# Patient Record
Sex: MALE | Race: WHITE | Employment: OTHER | URBAN - METROPOLITAN AREA
[De-identification: names, ages, dates, MRNs, and addresses within clinical notes are randomized per-mention and may not be internally consistent; named-entity substitution may affect disease eponyms.]

---

## 2017-02-22 ENCOUNTER — ANESTHESIA EVENT (OUTPATIENT)
Dept: SURGERY | Age: 52
End: 2017-02-22
Payer: COMMERCIAL

## 2017-02-22 ENCOUNTER — HOSPITAL ENCOUNTER (OUTPATIENT)
Dept: SURGERY | Age: 52
Discharge: HOME | End: 2017-02-22

## 2017-02-22 VITALS — BODY MASS INDEX: 29.73 KG/M2 | WEIGHT: 185 LBS | HEIGHT: 66 IN

## 2017-02-22 NOTE — PERIOP NOTES
Patient verified name, , and surgery as listed in [facility]. Type 1A surgery, PAT phone assessment complete. Orders received. Labs per surgeon: none. Labs per anesthesia protocol: none. Patient answered medical/surgical history questions at their best of ability. All prior to admission medications documented in [facility]. Patient instructed to take the following medications the day of surgery according to anesthesia guidelines with a small sip of water: none. Hold all vitamins 7 days prior to surgery and NSAIDS 5 days prior to surgery. Medications to be held: Probiotic. Patient instructed on the following and verbalizes understanding:  Arrive at [facility], time of arrival to be called the day before by 1700  NPO after midnight including gum, mints, and ice chips  Responsible adult must drive patient to the hospital, stay during surgery, and patient will  need supervision 24 hours after anesthesia  Use antibacterial soap and Hibiclens in shower the night before surgery and on the morning of surgery  Leave all valuables(money and jewelry) at home but bring insurance card and ID on DOS  Do not wear make-up, nail polish, lotions, cologne, perfumes, powders, or oil on skin.

## 2017-02-22 NOTE — BRIEF OP NOTE
BRIEF OPERATIVE NOTE    Date of Procedure: 2/23/2017     Preoperative Diagnosis:  Carpal tunnel syndrome, right [G56.01]    Postoperative Diagnosis:  SAME     Procedure(s): CARPAL TUNNEL RELEASE RIGHT WRIST    Surgeon(s) and Role:     * Osmany Blanc MD - Primary              Anesthesia: Petar     Complications: NONE        TOURNIQUET:  28 Kesha Ritter MD

## 2017-02-22 NOTE — H&P
[facility] HISTORY AND PHYSICAL    Subjective:     Patient is a 46 y.o. RHD MALE WITH RIGHT WRIST PAIN. SEE OFFICE NOTE. Patient Active Problem List    Diagnosis Date Noted    Hiatal hernia 02/02/2016     Past Medical History:   Diagnosis Date    GERD (gastroesophageal reflux disease)     controlled by probiotic    Hiatal hernia 02/02/2016    see on EGD      No past surgical history on file. Prior to Admission medications    Medication Sig Start Date End Date Taking? Authorizing Provider   MARYLUinfantis-B.ani-B.long-B.bifi (PROBIOTIC 4X) 10-15 mg TbEC Take  by mouth. Historical Provider     No Known Allergies   Social History   Substance Use Topics    Smoking status: Never Smoker    Smokeless tobacco: Never Used    Alcohol use No      Family History   Problem Relation Age of Onset    Dementia Mother     Heart defect Mother     No Known Problems Father     No Known Problems Sister       Review of Systems  A comprehensive review of systems was negative except for that written in the HPI. Objective:     No data found. There were no vitals taken for this visit. General:  Alert, cooperative, no distress, appears stated age. Head:  Normocephalic, without obvious abnormality, atraumatic. Back:   Symmetric, no curvature. ROM normal. No CVA tenderness. Lungs:   Clear to auscultation bilaterally. Chest wall:  No tenderness or deformity. Heart:  Regular rate and rhythm, S1, S2 normal, no murmur, click, rub or gallop. Extremities: Extremities normal, atraumatic, no cyanosis or edema. Pulses: 2+ and symmetric all extremities. Skin: Skin color, texture, turgor normal. No rashes or lesions   Lymph nodes: Cervical, supraclavicular, and axillary nodes normal.   Neurologic: CNII-XII intact. Normal strength, sensation and reflexes throughout.            Assessment:     CARPAL TUNNEL SYNDROME RIGHT WRIST    Plan:     The various methods of treatment have been discussed with the patient and family. PATIENT HAS EXHAUSTED NON-OPERATIVE MODALITIES. After consideration of risks, benefits and other options for treatment, the patient has consented to surgical intervention. SEE OFFICE NOTE.

## 2017-02-23 ENCOUNTER — SURGERY (OUTPATIENT)
Age: 52
End: 2017-02-23

## 2017-02-23 ENCOUNTER — ANESTHESIA (OUTPATIENT)
Dept: SURGERY | Age: 52
End: 2017-02-23
Payer: COMMERCIAL

## 2017-02-23 ENCOUNTER — HOSPITAL ENCOUNTER (OUTPATIENT)
Age: 52
Setting detail: OUTPATIENT SURGERY
Discharge: HOME | End: 2017-02-23
Attending: ORTHOPAEDIC SURGERY | Admitting: ORTHOPAEDIC SURGERY
Payer: COMMERCIAL

## 2017-02-23 VITALS
WEIGHT: 188.38 LBS | SYSTOLIC BLOOD PRESSURE: 113 MMHG | BODY MASS INDEX: 30.4 KG/M2 | TEMPERATURE: 97.6 F | DIASTOLIC BLOOD PRESSURE: 68 MMHG | HEART RATE: 56 BPM | RESPIRATION RATE: 16 BRPM | OXYGEN SATURATION: 98 %

## 2017-02-23 PROBLEM — G56.01 CARPAL TUNNEL SYNDROME ON RIGHT: Status: ACTIVE | Noted: 2017-02-23

## 2017-02-23 PROCEDURE — 74011250636 HC RX REV CODE- 250/636: Performed by: ANESTHESIOLOGY

## 2017-02-23 PROCEDURE — 77030018836 HC SOL IRR NACL ICUM -A: Performed by: ORTHOPAEDIC SURGERY

## 2017-02-23 PROCEDURE — 76060000032 HC ANESTHESIA 0.5 TO 1 HR: Performed by: ORTHOPAEDIC SURGERY

## 2017-02-23 PROCEDURE — 74011250636 HC RX REV CODE- 250/636

## 2017-02-23 PROCEDURE — 77030002916 HC SUT ETHLN J&J -A: Performed by: ORTHOPAEDIC SURGERY

## 2017-02-23 PROCEDURE — 76210000006 HC OR PH I REC 0.5 TO 1 HR: Performed by: ORTHOPAEDIC SURGERY

## 2017-02-23 PROCEDURE — 74011250636 HC RX REV CODE- 250/636: Performed by: ORTHOPAEDIC SURGERY

## 2017-02-23 PROCEDURE — 74011000250 HC RX REV CODE- 250: Performed by: ANESTHESIOLOGY

## 2017-02-23 PROCEDURE — 77030020753 HC CUF TRNQT 1BLA STRY -B: Performed by: ORTHOPAEDIC SURGERY

## 2017-02-23 PROCEDURE — 76010000138 HC OR TIME 0.5 TO 1 HR: Performed by: ORTHOPAEDIC SURGERY

## 2017-02-23 PROCEDURE — 76210000020 HC REC RM PH II FIRST 0.5 HR: Performed by: ORTHOPAEDIC SURGERY

## 2017-02-23 RX ORDER — ROPIVACAINE HYDROCHLORIDE 5 MG/ML
INJECTION, SOLUTION EPIDURAL; INFILTRATION; PERINEURAL AS NEEDED
Status: DISCONTINUED | OUTPATIENT
Start: 2017-02-23 | End: 2017-02-23 | Stop reason: HOSPADM

## 2017-02-23 RX ORDER — OXYCODONE HYDROCHLORIDE 5 MG/1
5 TABLET ORAL
Status: DISCONTINUED | OUTPATIENT
Start: 2017-02-23 | End: 2017-02-23 | Stop reason: HOSPADM

## 2017-02-23 RX ORDER — FLUMAZENIL 0.1 MG/ML
0.2 INJECTION INTRAVENOUS AS NEEDED
Status: DISCONTINUED | OUTPATIENT
Start: 2017-02-23 | End: 2017-02-23 | Stop reason: HOSPADM

## 2017-02-23 RX ORDER — SODIUM CHLORIDE, SODIUM LACTATE, POTASSIUM CHLORIDE, CALCIUM CHLORIDE 600; 310; 30; 20 MG/100ML; MG/100ML; MG/100ML; MG/100ML
75 INJECTION, SOLUTION INTRAVENOUS CONTINUOUS
Status: DISCONTINUED | OUTPATIENT
Start: 2017-02-23 | End: 2017-02-23 | Stop reason: HOSPADM

## 2017-02-23 RX ORDER — LIDOCAINE HYDROCHLORIDE 10 MG/ML
0.1 INJECTION INFILTRATION; PERINEURAL AS NEEDED
Status: DISCONTINUED | OUTPATIENT
Start: 2017-02-23 | End: 2017-02-23 | Stop reason: HOSPADM

## 2017-02-23 RX ORDER — MIDAZOLAM HYDROCHLORIDE 1 MG/ML
2 INJECTION, SOLUTION INTRAMUSCULAR; INTRAVENOUS
Status: DISCONTINUED | OUTPATIENT
Start: 2017-02-23 | End: 2017-02-23 | Stop reason: HOSPADM

## 2017-02-23 RX ORDER — NALOXONE HYDROCHLORIDE 0.4 MG/ML
0.1 INJECTION, SOLUTION INTRAMUSCULAR; INTRAVENOUS; SUBCUTANEOUS
Status: DISCONTINUED | OUTPATIENT
Start: 2017-02-23 | End: 2017-02-23 | Stop reason: HOSPADM

## 2017-02-23 RX ORDER — LIDOCAINE HYDROCHLORIDE 5 MG/ML
INJECTION, SOLUTION INFILTRATION; INTRAVENOUS AS NEEDED
Status: DISCONTINUED | OUTPATIENT
Start: 2017-02-23 | End: 2017-02-23 | Stop reason: HOSPADM

## 2017-02-23 RX ORDER — CEFAZOLIN SODIUM IN 0.9 % NACL 2 G/50 ML
2 INTRAVENOUS SOLUTION, PIGGYBACK (ML) INTRAVENOUS ONCE
Status: COMPLETED | OUTPATIENT
Start: 2017-02-23 | End: 2017-02-23

## 2017-02-23 RX ORDER — PROPOFOL 10 MG/ML
INJECTION, EMULSION INTRAVENOUS
Status: DISCONTINUED | OUTPATIENT
Start: 2017-02-23 | End: 2017-02-23 | Stop reason: HOSPADM

## 2017-02-23 RX ORDER — FENTANYL CITRATE 50 UG/ML
INJECTION, SOLUTION INTRAMUSCULAR; INTRAVENOUS AS NEEDED
Status: DISCONTINUED | OUTPATIENT
Start: 2017-02-23 | End: 2017-02-23 | Stop reason: HOSPADM

## 2017-02-23 RX ORDER — HYDROMORPHONE HYDROCHLORIDE 2 MG/ML
0.5 INJECTION, SOLUTION INTRAMUSCULAR; INTRAVENOUS; SUBCUTANEOUS
Status: DISCONTINUED | OUTPATIENT
Start: 2017-02-23 | End: 2017-02-23 | Stop reason: HOSPADM

## 2017-02-23 RX ORDER — DIPHENHYDRAMINE HYDROCHLORIDE 50 MG/ML
12.5 INJECTION, SOLUTION INTRAMUSCULAR; INTRAVENOUS
Status: DISCONTINUED | OUTPATIENT
Start: 2017-02-23 | End: 2017-02-23 | Stop reason: HOSPADM

## 2017-02-23 RX ORDER — OXYCODONE HYDROCHLORIDE 5 MG/1
10 TABLET ORAL
Status: DISCONTINUED | OUTPATIENT
Start: 2017-02-23 | End: 2017-02-23 | Stop reason: HOSPADM

## 2017-02-23 RX ORDER — MIDAZOLAM HYDROCHLORIDE 1 MG/ML
INJECTION, SOLUTION INTRAMUSCULAR; INTRAVENOUS AS NEEDED
Status: DISCONTINUED | OUTPATIENT
Start: 2017-02-23 | End: 2017-02-23 | Stop reason: HOSPADM

## 2017-02-23 RX ADMIN — MIDAZOLAM HYDROCHLORIDE 1 MG: 1 INJECTION, SOLUTION INTRAMUSCULAR; INTRAVENOUS at 07:14

## 2017-02-23 RX ADMIN — FENTANYL CITRATE 50 MCG: 50 INJECTION, SOLUTION INTRAMUSCULAR; INTRAVENOUS at 07:31

## 2017-02-23 RX ADMIN — FENTANYL CITRATE 50 MCG: 50 INJECTION, SOLUTION INTRAMUSCULAR; INTRAVENOUS at 07:22

## 2017-02-23 RX ADMIN — SODIUM CHLORIDE, SODIUM LACTATE, POTASSIUM CHLORIDE, AND CALCIUM CHLORIDE: 600; 310; 30; 20 INJECTION, SOLUTION INTRAVENOUS at 07:01

## 2017-02-23 RX ADMIN — LIDOCAINE HYDROCHLORIDE 50 ML: 5 INJECTION, SOLUTION INFILTRATION; INTRAVENOUS at 07:10

## 2017-02-23 RX ADMIN — MIDAZOLAM HYDROCHLORIDE 1 MG: 1 INJECTION, SOLUTION INTRAMUSCULAR; INTRAVENOUS at 07:05

## 2017-02-23 RX ADMIN — CEFAZOLIN 2 G: 1 INJECTION, POWDER, FOR SOLUTION INTRAMUSCULAR; INTRAVENOUS; PARENTERAL at 07:01

## 2017-02-23 RX ADMIN — PROPOFOL 75 MCG/KG/MIN: 10 INJECTION, EMULSION INTRAVENOUS at 07:14

## 2017-02-23 RX ADMIN — ROPIVACAINE HYDROCHLORIDE 10 ML: 5 INJECTION, SOLUTION EPIDURAL; INFILTRATION; PERINEURAL at 07:37

## 2017-02-23 RX ADMIN — LIDOCAINE HYDROCHLORIDE 0.1 ML: 10 INJECTION, SOLUTION INFILTRATION; PERINEURAL at 06:30

## 2017-02-23 RX ADMIN — SODIUM CHLORIDE, SODIUM LACTATE, POTASSIUM CHLORIDE, AND CALCIUM CHLORIDE 75 ML/HR: 600; 310; 30; 20 INJECTION, SOLUTION INTRAVENOUS at 06:04

## 2017-02-23 NOTE — H&P
Date of Surgery Update:  Lizette Virgen was seen and examined. History and physical has been reviewed. The patient has been examined.  There have been no significant clinical changes since the completion of the originally dated History and Physical.    Signed By: Faheem Hairston MD     February 23, 2017 6:22 AM

## 2017-02-23 NOTE — IP AVS SNAPSHOT
[address] 
[phone] Patient: Peace Toribio MRN: [id number] :1965 You are allergic to the following No active allergies Recent Documentation Weight 85.4 kg Emergency Contacts Name Discharge Info Relation Home Work Mobile Chary Yates  Spouse [3] [phone] About your hospitalization You were admitted on:  2017 You last received care in the:  [facility] PACU You were discharged on:  2017 Unit phone number:  [phone] Why you were hospitalized Your primary diagnosis was:  Carpal Tunnel Syndrome On Right Providers Seen During Your Hospitalizations Provider Role Specialty Primary office phone Anna Newman MD Attending Provider Orthopedic Surgery [phone] Your Primary Care Physician (PCP) Primary Care Physician Office Phone Office Fax Winferd Longford [phone] Follow-up Information Follow up With Details Comments Contact Info Keyanna Roche DO   [address] [place] [address] 
[phone] Anna Newman MD Call today Keep scheduled appointment or call to reschedule [facility] [address] 16 Current Discharge Medication List  
  
ASK your doctor about these medications Dose & Instructions Dispensing Information Comments Morning Noon Evening Bedtime PROBIOTIC 4X 10-15 mg Tbec Generic drug:  B.infantis-B.ani-B.long-B.bifi Your next dose is: Today, Tomorrow Other:  _________ Take  by mouth. Refills:  0 Discharge Instructions Carpal Tunnel Release: What to Expect at Home Your Recovery Your hand will hurt and may feel weak with some numbness.  This usually goes away in a few days, but it may take several months. Your doctor may remove the large bandage, or he or she will tell you when and how to remove it yourself. In some cases, you may have a splint. If you have one, you will wear it for about 2 weeks. Your doctor will take out your stitches in 1 to 2 weeks. Your hand and wrist may feel worse than they had felt. But the pain should begin to go away. It usually takes 3 to 4 months to recover and up to 1 year before hand strength returns. How much hand strength returns will vary. The timing of your return to work depends on the type of surgery you had, whether the surgery was on your dominant hand (the hand you use most), and your work activities. If you had open surgery on your dominant hand and you do repeated actions at work, you may be able to return to work in 10 to 8 weeks. Repeated motions include typing or assembly-line work. If the surgery was on the other hand and you do not do repeated actions at work, you may be able to return to work in 9 to 14 days. If you had endoscopic surgery, you may be able to return to work sooner than with open surgery. This care sheet gives you a general idea about how long it will take for you to recover. But each person recovers at a different pace. Follow the steps below to get better as quickly as possible. How can you care for yourself at home? Activity · Rest when you feel tired. Getting enough sleep will help you recover. · Try to walk each day. Start by walking a little more than you did the day before. Bit by bit, increase the amount you walk. · For up to 2 weeks after surgery, avoid lifting things heavier than 1 to 2 pounds and using your hand. This includes doing repeated arm or hand movements, such as typing or using a computer mouse, washing windows, vacuuming, or chopping food. Do not use power tools, and avoid activities that cause vibration. · You may begin heavier tasks about 4 weeks after surgery. These include vacuuming, mowing the lawn, and gardening. · You may shower 24 to 48 hours after surgery, if your doctor okays it. Keep your bandage dry by taping a sheet of plastic to cover it. If you have a splint, keep it dry. Your doctor will tell you whether you can remove it when you shower. Be careful not to put the splint on too tight. Do not take a bath until the incision heals, or until your doctor tells you it is okay. · You may drive when you are fully able to use your hand. Diet · You can eat your normal diet. If your stomach is upset, try bland, low-fat foods like plain rice, broiled chicken, toast, and yogurt. Medicines · Your doctor will tell you if and when you can restart your medicines. He or she will also give you instructions about taking any new medicines. · If you take blood thinners, such as warfarin (Coumadin), clopidogrel (Plavix), or aspirin, be sure to talk to your doctor. He or she will tell you if and when to start taking those medicines again. Make sure that you understand exactly what your doctor wants you to do. · Take pain medicines exactly as directed. ¨ If the doctor gave you a prescription medicine for pain, take it as prescribed. ¨ If you are not taking a prescription pain medicine, take an over-the-counter medicine such as acetaminophen (Tylenol), ibuprofen (Advil, Motrin), or naproxen (Aleve). Read and follow all instructions on the label. ¨ Do not take two or more pain medicines at the same time unless the doctor told you to. Many pain medicines have acetaminophen, which is Tylenol. Too much acetaminophen (Tylenol) can be harmful. · If you think your pain medicine is making you sick to your stomach: 
¨ Take your medicine after meals (unless your doctor has told you not to). ¨ Ask your doctor for a different pain medicine. · If your doctor prescribed antibiotics, take them as directed.  Do not stop taking them just because you feel better. You need to take the full course of antibiotics. Incision and splint care · Keep your bandage dry. If it gets dirty, you may change it. · If you have a splint, talk to your doctor about when you should wear it. Exercise · You may need wrist and hand rehabilitation. This is a series of exercises you do after your surgery. This helps you get back your wrist's and hand's range of motion, strength, and . You will work with your doctor and physical or occupational therapist to plan this exercise program. To get the best results, you need to do the exercises correctly and as often and as long as your doctor tells you. Ice and elevation · Put ice or a cold pack on your wrist for 10 to 20 minutes at a time. Try to do this every 1 to 2 hours for the next 3 days (when you are awake) or until the swelling goes down. Put a thin cloth between the ice and your skin. · Prop up the sore wrist on a pillow when you ice it or anytime you sit or lie down during the next 3 days. Try to keep it above the level of your heart. This will help reduce swelling. Other instructions · Avoid letting your hand hang down. This can cause swelling. Follow-up care is a key part of your treatment and safety. Be sure to make and go to all appointments, and call your doctor if you are having problems. It's also a good idea to know your test results and keep a list of the medicines you take. When should you call for help? Call 911 anytime you think you may need emergency care. For example, call if: 
· You passed out (lost consciousness). · You have severe trouble breathing. · You have sudden chest pain and shortness of breath, or you cough up blood. Call your doctor now or seek immediate medical care if: 
· You have pain that does not get better after you take pain medicine. · You have loose stitches, or your incision comes open. · You continue to bleed from the incision after surgery. · You have signs of infection, such as: 
¨ Increased pain, swelling, warmth, or redness. ¨ Red streaks leading from the incision. ¨ Pus draining from the incision. ¨ Swollen lymph nodes in your neck, armpits, or groin. ¨ A fever. · Your hand is cool or pale, or it changes color. Watch closely for any changes in your health, and be sure to contact your doctor if: 
· You notice more tingling or weakness in your hand. · You are sick to your stomach or cannot keep fluids down. · You are not getting better as expected. Where can you learn more? Go to http://silva-quoc.info/. Enter N388 in the search box to learn more about \"Carpal Tunnel Release: What to Expect at Home. \" Current as of: May 23, 2016 Content Version: 11.1 © 3521-4229 [employer]. Care instructions adapted under license by [employer] (which disclaims liability or warranty for this information). If you have questions about a medical condition or this instruction, always ask your healthcare professional. [place] [address] any warranty or liability for your use of this information. Discharge Orders None Introducing [facility]! [address] introduces Oobafit patient portal. Now you can access parts of your medical record, email your doctor's office, and request medication refills online. 1. In your internet browser, go to [email or web address] 2. Click on the First Time User? Click Here link in the Sign In box. You will see the New Member Sign Up page. 3. Enter your Oobafit Access Code exactly as it appears below. You will not need to use this code after youve completed the sign-up process. If you do not sign up before the expiration date, you must request a new code. · Oobafit Access Code: [email or web address] Expires: 5/8/2017  1:54 PM 
 
4.  Enter the last four digits of your Social Security Number (xxxx) and Date of Birth (mm/dd/yyyy) as indicated and click Submit. You will be taken to the next sign-up page. 5. Create a Keoghs ID. This will be your Keoghs login ID and cannot be changed, so think of one that is secure and easy to remember. 6. Create a Keoghs password. You can change your password at any time. 7. Enter your Password Reset Question and Answer. This can be used at a later time if you forget your password. 8. Enter your e-mail address. You will receive e-mail notification when new information is available in [address] 9. Click Sign Up. You can now view and download portions of your medical record. 10. Click the Download Summary menu link to download a portable copy of your medical information. If you have questions, please visit the Frequently Asked Questions section of the Keoghs website. Remember, Keoghs is NOT to be used for urgent needs. For medical emergencies, dial 911. Now available from your iPhone and Android! General Information Please provide this summary of care documentation to your next provider. Patient Signature:  ____________________________________________________________ Date:  ____________________________________________________________  
  
Tsering Fulton Provider Signature:  ____________________________________________________________ Date:  ____________________________________________________________

## 2017-02-23 NOTE — IP AVS SNAPSHOT
Current Discharge Medication List  
  
ASK your doctor about these medications Dose & Instructions Dispensing Information Comments Morning Noon Evening Bedtime PROBIOTIC 4X 10-15 mg Tbec Generic drug:  B.infantis-B.ani-B.long-B.bifi Your next dose is: Today, Tomorrow Other:  ____________ Take  by mouth. Refills:  0

## 2017-02-23 NOTE — ANESTHESIA POSTPROCEDURE EVALUATION
Post-Anesthesia Evaluation and Assessment    Patient: Phyllis Lemus MRN: [id number]  SSN: [id number]    YOB: 1965  Age: 46 y.o. Sex: male       Cardiovascular Function/Vital Signs  Visit Vitals    /66 (BP 1 Location: Left arm, BP Patient Position: At rest)    Pulse 65    Temp 36.4 °C (97.6 °F)    Resp 16    Wt 85.4 kg (188 lb 6 oz)    SpO2 97%    BMI 30.4 kg/m2       Patient is status post regional anesthesia for Procedure(s):  RIGHT HAND CARPAL TUNNEL RELEASE. Nausea/Vomiting: None    Postoperative hydration reviewed and adequate. Pain:  Pain Scale 1: Numeric (0 - 10) (02/23/17 0747)  Pain Intensity 1: 0 (02/23/17 0747)   Managed    Neurological Status:   Neuro (WDL): Exceptions to WDL (02/23/17 0747)  Neuro  Neurologic State: Drowsy (02/23/17 7041)  Orientation Level: Oriented to person;Oriented to place;Oriented to situation (02/23/17 0747)  Cognition: Follows commands (02/23/17 0747)  Speech: Clear (02/23/17 0747)   At baseline    Mental Status and Level of Consciousness: Arousable    Pulmonary Status:   O2 Device: Room air (02/23/17 0758)   Adequate oxygenation and airway patent    Complications related to anesthesia: None    Post-anesthesia assessment completed.  No concerns    Signed By: Irasema Zamora MD     February 23, 2017

## 2017-02-23 NOTE — OP NOTES
[facility] 65   OPERATIVE REPORT       Name:  Donovan Ivan   MR#:  [id number]   :  1965   Account #:  [de-identified]   Date of Adm:  2017       DATE OF SURGERY: 2017    PREOPERATIVE DIAGNOSES: Carpal tunnel syndrome, right wrist.    POSTOPERATIVE DIAGNOSES: Carpal tunnel syndrome, right wrist.    PROCEDURE: Carpal tunnel release, right wrist.    OPERATIVE SURGEON: Sherwin Foster. Peace Bonner MD    ANESTHESIA: [place] block. PATHOLOGY: Carpal tunnel syndrome. CURRENT PROCEDURAL TERMINOLOGY CODE: 03099. INTERNATIONAL CLASSIFICATION OF DISEASES 10 CODE: G56.01.    TOURNIQUET TIME: Including Petar block time was 28 minutes. INDICATIONS: The patient is a 26-year-old gentleman who has   developed right hand numbness and tingling. Preoperative   physical exam, radiographs, and EMG nerve conduction studies   demonstrate carpal tunnel syndrome, right wrist. The patient has   exhausted nonoperative modalities and is electively admitted for   a carpal tunnel release, right wrist.    DESCRIPTION OF PROCEDURE: Following identification, the patient   was taken to the operating suite. Following induction of Manns Choice   block anesthesia and 2 g of IV Ancef, the patient was positioned   on the operating table in the supine fashion. Right arm was then   elevated, exsanguinated with an Esmarch bandage. Tourniquet was   elevated to 250 mmHg. Mann[place] Choice block was applied. Once that was   achieved, the right arm and fingers were prepped and draped in a   sterile fashion. A 3 cm incision based over the volar wrist   crease distally was performed. Skin was incised. Subcutaneous   tissue was then dissected down to the median nerve. Median nerve   was protected throughout the procedure. Transverse carpal   ligament was identified. Transverse carpal ligament was   released. Median nerves were protected throughout the procedure.    Once an adequate decompression was achieved, the floor of the   carpal tunnel was inspected. There was no evidence of any loose   body, foreign body, or mass. Median nerve was intact. Recurrent   branch of the median nerve was intact as well. At this point,   the wound was then irrigated and closed with 3-0 nylon   horizontal mattress sutures. The hand was injected with 10 mL of   Naropin without epinephrine. A sterile dressing was applied. Tourniquet was released. Tourniquet time was 28 minutes. The   patient was then transferred to the recovery room in stable   condition.         MD BRUNILDA Tam / [id number]   D:  02/23/2017   07:47   T:  02/23/2017   11:07   Job #:  [id number]

## 2017-02-23 NOTE — ANESTHESIA PREPROCEDURE EVALUATION
Anesthetic History   No history of anesthetic complications            Review of Systems / Medical History  Patient summary reviewed, nursing notes reviewed and pertinent labs reviewed    Pulmonary  Within defined limits                 Neuro/Psych   Within defined limits           Cardiovascular  Within defined limits                Exercise tolerance: >4 METS     GI/Hepatic/Renal     GERD (No meds): well controlled           Endo/Other  Within defined limits           Other Findings              Physical Exam    Airway  Mallampati: II  TM Distance: > 6 cm  Neck ROM: normal range of motion   Mouth opening: Normal     Cardiovascular    Rhythm: regular  Rate: normal         Dental  No notable dental hx       Pulmonary  Breath sounds clear to auscultation               Abdominal         Other Findings            Anesthetic Plan    ASA: 2  Anesthesia type: regional - Petar block            Anesthetic plan and risks discussed with: Patient and Spouse

## 2017-02-23 NOTE — DISCHARGE INSTRUCTIONS
Carpal Tunnel Release: What to Expect at Home  Your Recovery  Your hand will hurt and may feel weak with some numbness. This usually goes away in a few days, but it may take several months. Your doctor may remove the large bandage, or he or she will tell you when and how to remove it yourself. In some cases, you may have a splint. If you have one, you will wear it for about 2 weeks. Your doctor will take out your stitches in 1 to 2 weeks. Your hand and wrist may feel worse than they had felt. But the pain should begin to go away. It usually takes 3 to 4 months to recover and up to 1 year before hand strength returns. How much hand strength returns will vary. The timing of your return to work depends on the type of surgery you had, whether the surgery was on your dominant hand (the hand you use most), and your work activities. If you had open surgery on your dominant hand and you do repeated actions at work, you may be able to return to work in 10 to 8 weeks. Repeated motions include typing or assembly-line work. If the surgery was on the other hand and you do not do repeated actions at work, you may be able to return to work in 9 to 14 days. If you had endoscopic surgery, you may be able to return to work sooner than with open surgery. This care sheet gives you a general idea about how long it will take for you to recover. But each person recovers at a different pace. Follow the steps below to get better as quickly as possible. How can you care for yourself at home? Activity  · Rest when you feel tired. Getting enough sleep will help you recover. · Try to walk each day. Start by walking a little more than you did the day before. Bit by bit, increase the amount you walk. · For up to 2 weeks after surgery, avoid lifting things heavier than 1 to 2 pounds and using your hand.  This includes doing repeated arm or hand movements, such as typing or using a computer mouse, washing windows, vacuuming, or chopping food. Do not use power tools, and avoid activities that cause vibration. · You may begin heavier tasks about 4 weeks after surgery. These include vacuuming, mowing the lawn, and gardening. · You may shower 24 to 48 hours after surgery, if your doctor okays it. Keep your bandage dry by taping a sheet of plastic to cover it. If you have a splint, keep it dry. Your doctor will tell you whether you can remove it when you shower. Be careful not to put the splint on too tight. Do not take a bath until the incision heals, or until your doctor tells you it is okay. · You may drive when you are fully able to use your hand. Diet  · You can eat your normal diet. If your stomach is upset, try bland, low-fat foods like plain rice, broiled chicken, toast, and yogurt. Medicines  · Your doctor will tell you if and when you can restart your medicines. He or she will also give you instructions about taking any new medicines. · If you take blood thinners, such as warfarin (Coumadin), clopidogrel (Plavix), or aspirin, be sure to talk to your doctor. He or she will tell you if and when to start taking those medicines again. Make sure that you understand exactly what your doctor wants you to do. · Take pain medicines exactly as directed. ¨ If the doctor gave you a prescription medicine for pain, take it as prescribed. ¨ If you are not taking a prescription pain medicine, take an over-the-counter medicine such as acetaminophen (Tylenol), ibuprofen (Advil, Motrin), or naproxen (Aleve). Read and follow all instructions on the label. ¨ Do not take two or more pain medicines at the same time unless the doctor told you to. Many pain medicines have acetaminophen, which is Tylenol. Too much acetaminophen (Tylenol) can be harmful. · If you think your pain medicine is making you sick to your stomach:  ¨ Take your medicine after meals (unless your doctor has told you not to). ¨ Ask your doctor for a different pain medicine.   · If your doctor prescribed antibiotics, take them as directed. Do not stop taking them just because you feel better. You need to take the full course of antibiotics. Incision and splint care  · Keep your bandage dry. If it gets dirty, you may change it. · If you have a splint, talk to your doctor about when you should wear it. Exercise  · You may need wrist and hand rehabilitation. This is a series of exercises you do after your surgery. This helps you get back your wrist's and hand's range of motion, strength, and . You will work with your doctor and physical or occupational therapist to plan this exercise program. To get the best results, you need to do the exercises correctly and as often and as long as your doctor tells you. Ice and elevation  · Put ice or a cold pack on your wrist for 10 to 20 minutes at a time. Try to do this every 1 to 2 hours for the next 3 days (when you are awake) or until the swelling goes down. Put a thin cloth between the ice and your skin. · Prop up the sore wrist on a pillow when you ice it or anytime you sit or lie down during the next 3 days. Try to keep it above the level of your heart. This will help reduce swelling. Other instructions  · Avoid letting your hand hang down. This can cause swelling. Follow-up care is a key part of your treatment and safety. Be sure to make and go to all appointments, and call your doctor if you are having problems. It's also a good idea to know your test results and keep a list of the medicines you take. When should you call for help? Call 911 anytime you think you may need emergency care. For example, call if:  · You passed out (lost consciousness). · You have severe trouble breathing. · You have sudden chest pain and shortness of breath, or you cough up blood. Call your doctor now or seek immediate medical care if:  · You have pain that does not get better after you take pain medicine.   · You have loose stitches, or your incision comes open.  · You continue to bleed from the incision after surgery. · You have signs of infection, such as:  ¨ Increased pain, swelling, warmth, or redness. ¨ Red streaks leading from the incision. ¨ Pus draining from the incision. ¨ Swollen lymph nodes in your neck, armpits, or groin. ¨ A fever. · Your hand is cool or pale, or it changes color. Watch closely for any changes in your health, and be sure to contact your doctor if:  · You notice more tingling or weakness in your hand. · You are sick to your stomach or cannot keep fluids down. · You are not getting better as expected. Where can you learn more? Go to http://silva-quoc.info/. Enter N388 in the search box to learn more about \"Carpal Tunnel Release: What to Expect at Home. \"  Current as of: May 23, 2016  Content Version: 11.1  © 9304-7587 [employer]. Care instructions adapted under license by [employer] (which disclaims liability or warranty for this information). If you have questions about a medical condition or this instruction, always ask your healthcare professional. [place] [address] any warranty or liability for your use of this information.

## 2020-04-29 ENCOUNTER — HOSPITAL ENCOUNTER (OUTPATIENT)
Dept: SURGERY | Age: 55
Discharge: HOME | End: 2020-04-29

## 2020-04-29 VITALS — HEIGHT: 67 IN | BODY MASS INDEX: 29.82 KG/M2 | WEIGHT: 190 LBS

## 2020-04-29 RX ORDER — OMEPRAZOLE 20 MG/1
20 TABLET, DELAYED RELEASE ORAL AS NEEDED
COMMUNITY
End: 2021-07-21 | Stop reason: DRUGHIGH

## 2020-04-29 NOTE — PERIOP NOTES
Patient verified name and . Order for consent NOT found in EHR and unable to match consent with case posting; patient verifies procedure. Type 1B surgery, phone assessment complete. Orders NOT received. Labs per surgeon: unknown, no orders  Labs per anesthesia protocol: none      Patient answered medical/surgical history questions at their best of ability. All prior to admission medications documented in [facility]. Patient instructed to take the following medications the day of surgery according to anesthesia guidelines with a small sip of water: NONE. Hold all vitamins 7 days prior to surgery and NSAIDS 5 days prior to surgery. Prescription meds to hold: Meloxicam hold for 5 days prior to surgery. Patient instructed on the following:  Visitor policy- none. Arrive at [address], time of arrival to be called the day before by 1700  NPO after midnight including gum, mints, and ice chips  Responsible adult must drive patient to the hospital, stay during surgery, and patient will need supervision 24 hours after anesthesia  Use antibacterial soap in shower the night before surgery and on the morning of surgery  All piercings must be removed prior to arrival.    Leave all valuables (money and jewelry) at home but bring insurance card and ID on       DOS. Do not wear make-up, nail polish, lotions, cologne, perfumes, powders, or oil on skin. Patient teach back successful and patient demonstrates knowledge of instruction.

## 2020-05-20 NOTE — PERIOP NOTES
Pt notified that a negative Covid swab result is required to proceed with surgery; the swab will be collected 7 days prior to surgery at the [address] at [address] [place] [address]. Appointment date/time found in EHR and provided to patient.

## 2020-05-21 LAB — SARS-COV-2, NAA: NOT DETECTED

## 2020-05-27 ENCOUNTER — ANESTHESIA EVENT (OUTPATIENT)
Dept: SURGERY | Age: 55
End: 2020-05-27
Payer: COMMERCIAL

## 2020-05-28 ENCOUNTER — HOSPITAL ENCOUNTER (OUTPATIENT)
Age: 55
Setting detail: OUTPATIENT SURGERY
Discharge: HOME | End: 2020-05-28
Attending: PODIATRIST | Admitting: PODIATRIST
Payer: COMMERCIAL

## 2020-05-28 ENCOUNTER — ANESTHESIA (OUTPATIENT)
Dept: SURGERY | Age: 55
End: 2020-05-28
Payer: COMMERCIAL

## 2020-05-28 VITALS
HEIGHT: 67 IN | OXYGEN SATURATION: 98 % | BODY MASS INDEX: 30.51 KG/M2 | WEIGHT: 194.4 LBS | HEART RATE: 68 BPM | DIASTOLIC BLOOD PRESSURE: 67 MMHG | SYSTOLIC BLOOD PRESSURE: 124 MMHG | RESPIRATION RATE: 18 BRPM | TEMPERATURE: 98 F

## 2020-05-28 DIAGNOSIS — Z98.890 S/P FOOT SURGERY, LEFT: Primary | ICD-10-CM

## 2020-05-28 PROCEDURE — 74011000250 HC RX REV CODE- 250: Performed by: PODIATRIST

## 2020-05-28 PROCEDURE — 77030036649: Performed by: PODIATRIST

## 2020-05-28 PROCEDURE — 77030041330 HC GRFT TISS BN MTRX ORGA -H: Performed by: PODIATRIST

## 2020-05-28 PROCEDURE — 74011000250 HC RX REV CODE- 250: Performed by: NURSE ANESTHETIST, CERTIFIED REGISTERED

## 2020-05-28 PROCEDURE — 76010000138 HC OR TIME 0.5 TO 1 HR: Performed by: PODIATRIST

## 2020-05-28 PROCEDURE — 76210000063 HC OR PH I REC FIRST 0.5 HR: Performed by: PODIATRIST

## 2020-05-28 PROCEDURE — 74011250636 HC RX REV CODE- 250/636: Performed by: PODIATRIST

## 2020-05-28 PROCEDURE — 74011250636 HC RX REV CODE- 250/636: Performed by: NURSE ANESTHETIST, CERTIFIED REGISTERED

## 2020-05-28 PROCEDURE — 76210000020 HC REC RM PH II FIRST 0.5 HR: Performed by: PODIATRIST

## 2020-05-28 PROCEDURE — 76060000032 HC ANESTHESIA 0.5 TO 1 HR: Performed by: PODIATRIST

## 2020-05-28 PROCEDURE — 74011250636 HC RX REV CODE- 250/636: Performed by: ANESTHESIOLOGY

## 2020-05-28 DEVICE — GRAFT HUM TISS M COVERING WND INVIVO NUCEL: Type: IMPLANTABLE DEVICE | Site: FOOT | Status: FUNCTIONAL

## 2020-05-28 RX ORDER — FENTANYL CITRATE 50 UG/ML
INJECTION, SOLUTION INTRAMUSCULAR; INTRAVENOUS AS NEEDED
Status: DISCONTINUED | OUTPATIENT
Start: 2020-05-28 | End: 2020-05-28 | Stop reason: HOSPADM

## 2020-05-28 RX ORDER — LIDOCAINE HYDROCHLORIDE 10 MG/ML
0.1 INJECTION INFILTRATION; PERINEURAL AS NEEDED
Status: DISCONTINUED | OUTPATIENT
Start: 2020-05-28 | End: 2020-05-28 | Stop reason: HOSPADM

## 2020-05-28 RX ORDER — NALOXONE HYDROCHLORIDE 0.4 MG/ML
0.1 INJECTION, SOLUTION INTRAMUSCULAR; INTRAVENOUS; SUBCUTANEOUS
Status: DISCONTINUED | OUTPATIENT
Start: 2020-05-28 | End: 2020-05-28 | Stop reason: HOSPADM

## 2020-05-28 RX ORDER — SODIUM CHLORIDE, SODIUM LACTATE, POTASSIUM CHLORIDE, CALCIUM CHLORIDE 600; 310; 30; 20 MG/100ML; MG/100ML; MG/100ML; MG/100ML
75 INJECTION, SOLUTION INTRAVENOUS CONTINUOUS
Status: DISCONTINUED | OUTPATIENT
Start: 2020-05-28 | End: 2020-05-28 | Stop reason: HOSPADM

## 2020-05-28 RX ORDER — FLUMAZENIL 0.1 MG/ML
0.2 INJECTION INTRAVENOUS AS NEEDED
Status: DISCONTINUED | OUTPATIENT
Start: 2020-05-28 | End: 2020-05-28 | Stop reason: HOSPADM

## 2020-05-28 RX ORDER — LIDOCAINE HYDROCHLORIDE 10 MG/ML
INJECTION INFILTRATION; PERINEURAL AS NEEDED
Status: DISCONTINUED | OUTPATIENT
Start: 2020-05-28 | End: 2020-05-28 | Stop reason: HOSPADM

## 2020-05-28 RX ORDER — PROPOFOL 10 MG/ML
INJECTION, EMULSION INTRAVENOUS
Status: DISCONTINUED | OUTPATIENT
Start: 2020-05-28 | End: 2020-05-28 | Stop reason: HOSPADM

## 2020-05-28 RX ORDER — LIDOCAINE HYDROCHLORIDE 20 MG/ML
INJECTION, SOLUTION EPIDURAL; INFILTRATION; INTRACAUDAL; PERINEURAL AS NEEDED
Status: DISCONTINUED | OUTPATIENT
Start: 2020-05-28 | End: 2020-05-28 | Stop reason: HOSPADM

## 2020-05-28 RX ORDER — TRAMADOL HYDROCHLORIDE 50 MG/1
50 TABLET ORAL
Qty: 20 TAB | Refills: 0 | Status: SHIPPED | OUTPATIENT
Start: 2020-05-28 | End: 2020-06-02

## 2020-05-28 RX ORDER — MIDAZOLAM HYDROCHLORIDE 1 MG/ML
2 INJECTION, SOLUTION INTRAMUSCULAR; INTRAVENOUS
Status: DISCONTINUED | OUTPATIENT
Start: 2020-05-28 | End: 2020-05-28 | Stop reason: HOSPADM

## 2020-05-28 RX ORDER — DIPHENHYDRAMINE HYDROCHLORIDE 50 MG/ML
12.5 INJECTION, SOLUTION INTRAMUSCULAR; INTRAVENOUS
Status: DISCONTINUED | OUTPATIENT
Start: 2020-05-28 | End: 2020-05-28 | Stop reason: HOSPADM

## 2020-05-28 RX ORDER — HYDROMORPHONE HYDROCHLORIDE 2 MG/ML
0.5 INJECTION, SOLUTION INTRAMUSCULAR; INTRAVENOUS; SUBCUTANEOUS
Status: DISCONTINUED | OUTPATIENT
Start: 2020-05-28 | End: 2020-05-28 | Stop reason: HOSPADM

## 2020-05-28 RX ORDER — CEFAZOLIN SODIUM/WATER 2 G/20 ML
2 SYRINGE (ML) INTRAVENOUS ONCE
Status: COMPLETED | OUTPATIENT
Start: 2020-05-28 | End: 2020-05-28

## 2020-05-28 RX ORDER — OXYCODONE HYDROCHLORIDE 5 MG/1
5 TABLET ORAL
Status: DISCONTINUED | OUTPATIENT
Start: 2020-05-28 | End: 2020-05-28 | Stop reason: HOSPADM

## 2020-05-28 RX ORDER — OXYCODONE HYDROCHLORIDE 5 MG/1
10 TABLET ORAL
Status: DISCONTINUED | OUTPATIENT
Start: 2020-05-28 | End: 2020-05-28 | Stop reason: HOSPADM

## 2020-05-28 RX ORDER — PROPOFOL 10 MG/ML
INJECTION, EMULSION INTRAVENOUS AS NEEDED
Status: DISCONTINUED | OUTPATIENT
Start: 2020-05-28 | End: 2020-05-28 | Stop reason: HOSPADM

## 2020-05-28 RX ORDER — BUPIVACAINE HYDROCHLORIDE 5 MG/ML
INJECTION, SOLUTION EPIDURAL; INTRACAUDAL AS NEEDED
Status: DISCONTINUED | OUTPATIENT
Start: 2020-05-28 | End: 2020-05-28 | Stop reason: HOSPADM

## 2020-05-28 RX ADMIN — SODIUM CHLORIDE, SODIUM LACTATE, POTASSIUM CHLORIDE, AND CALCIUM CHLORIDE 75 ML/HR: 600; 310; 30; 20 INJECTION, SOLUTION INTRAVENOUS at 13:00

## 2020-05-28 RX ADMIN — FENTANYL CITRATE 50 MCG: 50 INJECTION INTRAMUSCULAR; INTRAVENOUS at 13:31

## 2020-05-28 RX ADMIN — Medication 2 G: at 13:27

## 2020-05-28 RX ADMIN — FENTANYL CITRATE 50 MCG: 50 INJECTION INTRAMUSCULAR; INTRAVENOUS at 13:57

## 2020-05-28 RX ADMIN — PROPOFOL 120 MCG/KG/MIN: 10 INJECTION, EMULSION INTRAVENOUS at 13:39

## 2020-05-28 RX ADMIN — PROPOFOL 50 MG: 10 INJECTION, EMULSION INTRAVENOUS at 13:34

## 2020-05-28 RX ADMIN — LIDOCAINE HYDROCHLORIDE 20 MG: 20 INJECTION, SOLUTION EPIDURAL; INFILTRATION; INTRACAUDAL; PERINEURAL at 13:34

## 2020-05-28 NOTE — H&P
Patient: Blanquita Guillen MRN: [id number]  SSN: [id number]    YOB: 1965  Age: 47 y.o. Sex: male      History and Physical    Blanquita Guillen is a 47 y.o. male having Procedure(s):  LEFT FOOT TENEX ULTRASOUND GUIDED PLANTAR FASCIOTOMY LOCAL W/ MAC. Allergies: No Known Allergies     Chief Complaint: L plantar fasciitis    History of Present Illness: L foot tenex ultrasound guided plantar fasciotomy    Past Medical History:   Diagnosis Date    Arthritis     left index finger    Carpal tunnel syndrome, right     GERD (gastroesophageal reflux disease)     controlled by probiotic    Hiatal hernia 02/02/2016    see on EGD    Hyperlipidemia     Plantar fasciitis, left       Past Surgical History:   Procedure Laterality Date    HX CARPAL TUNNEL RELEASE Right     HX COLONOSCOPY        Family History   Problem Relation Age of Onset    Dementia Mother     Heart defect Mother     No Known Problems Father     No Known Problems Sister       Social History     Tobacco Use    Smoking status: Never Smoker    Smokeless tobacco: Never Used   Substance Use Topics    Alcohol use: No        Prior to Admission medications    Medication Sig Start Date End Date Taking? Authorizing Provider   omeprazole (PriLOSEC OTC) 20 mg tablet Take 20 mg by mouth as needed. Indications: gastroesophageal reflux disease    Provider, Historical   meloxicam (MOBIC) 15 mg tablet Take 1 Tab by mouth daily. Patient taking differently: Take 15 mg by mouth as needed. For plantar fascitis foot pain 4/6/20   Kaylynn ORR DO   rosuvastatin (CRESTOR) 5 mg tablet Take 1 Tab by mouth nightly. Indications: excessive fat in the blood 4/6/20   Jeffery Baker DO        There were no vitals taken for this visit. Assessment and Plan:   Blanquita Guillen is a 47 y.o. male having Procedure(s):  LEFT FOOT TENEX ULTRASOUND GUIDED PLANTAR FASCIOTOMY LOCAL W/ MAC for plantar fasciitis.     Preanesthesia Evaluation     Last edited 05/28/20 1253 by Amandeep Neves MD  Date of Service 05/28/20 1253             Anesthetic History   No history of anesthetic complications            Review of Systems / Medical History  Patient summary reviewed, nursing notes reviewed and pertinent labs reviewed    Pulmonary  Within defined limits                 Neuro/Psych   Within defined limits           Cardiovascular  Within defined limits                Exercise tolerance: >4 METS     GI/Hepatic/Renal     GERD (No meds): well controlled           Endo/Other  Within defined limits           Other Findings              Physical Exam    Airway  Mallampati: II  TM Distance: > 6 cm  Neck ROM: normal range of motion   Mouth opening: Normal     Cardiovascular    Rhythm: regular  Rate: normal         Dental  No notable dental hx       Pulmonary  Breath sounds clear to auscultation               Abdominal         Other Findings            Anesthetic Plan    ASA: 2  Anesthesia type: total IV anesthesia            Anesthetic plan and risks discussed with: Patient               Preanesthesia evaluation performed by Amandeep Neves MD            Signed By: Jose Miguel Ospina MD     May 28, 2020

## 2020-05-28 NOTE — DISCHARGE INSTRUCTIONS
INSTRUCTIONS FOLLOWING FOOT SURGERY    ACTIVITY  Elevate foot (feet) for 48 hours. Use crutches as directed by your doctor. No weight bearing on operative foot. Weight bearing as tolerated in post op shoe. DIET  Clear liquids until no nausea or vomiting; then light diet for the first day. Advance to regular diet on second day, unless your doctor orders otherwise. PAIN  Take pain medications as directed by your doctor. Call your doctor if pain is NOT relieved by medication. DO NOT take aspirin or blood thinners until directed by your doctor. DRESSING CARE      FOLLOW-UP PHONE CALLS  Calls will be made by nursing staff. If you have any problems, call your doctor as needed. CALL YOUR DOCTOR IF YOU HAVE  Excessive bleeding that does not stop after holding mild pressure over the area. Temperature of 101 degrees or above. Redness, excessive swelling or bruising, and/or green or yellow, smelly discharge from incision. Loss of sensation - cold, white or blue toes. AFTER ANESTHESIA  For the first 24 hours and while taking narcotics for pain: DO NOT Drive, Drink Alcoholic beverages, or make important Decisions. Be aware of dizziness following anesthesia and while taking pain medication. OTHER INSTRUCTIONS    APPOINTMENT DATE/TIME_________________________________    Timmothy Son DOCTOR'S PHONE NUMBER__________________________ACTIVITY  · As tolerated and as directed by your doctor. · Bathe or shower as directed by your doctor. DIET  · Clear liquids until no nausea or vomiting; then light diet for the first day. · Advance to regular diet on second day, unless your doctor orders otherwise. · If nausea and vomiting continues, call your doctor. PAIN  · Take pain medication as directed by your doctor. · Call your doctor if pain is NOT relieved by medication. · DO NOT take aspirin of blood thinners unless directed by your doctor.      DRESSING CARE       CALL YOUR DOCTOR IF   · Excessive bleeding that does not stop after holding pressure over the area  · Temperature of 101 degrees F or above  · Excessive redness, swelling or bruising, and/ or green or yellow, smelly discharge from incision    AFTER ANESTHESIA   · For the first 24 hours: DO NOT Drive, Drink alcoholic beverages, or Make important decisions. · Be aware of dizziness following anesthesia and while taking pain medication. APPOINTMENT DATE/ TIME    YOUR DOCTOR'S PHONE NUMBER       DISCHARGE SUMMARY from Nurse    PATIENT INSTRUCTIONS:    After general anesthesia or intravenous sedation, for 24 hours or while taking prescription Narcotics:  · Limit your activities  · Do not drive and operate hazardous machinery  · Do not make important personal or business decisions  · Do  not drink alcoholic beverages  · If you have not urinated within 8 hours after discharge, please contact your surgeon on call. *  Please give a list of your current medications to your Primary Care Provider. *  Please update this list whenever your medications are discontinued, doses are      changed, or new medications (including over-the-counter products) are added. *  Please carry medication information at all times in case of emergency situations. These are general instructions for a healthy lifestyle:    No smoking/ No tobacco products/ Avoid exposure to second hand smoke    Surgeon General's Warning:  Quitting smoking now greatly reduces serious risk to your health. Obesity, smoking, and sedentary lifestyle greatly increases your risk for illness    A healthy diet, regular physical exercise & weight monitoring are important for maintaining a healthy lifestyle    You may be retaining fluid if you have a history of heart failure or if you experience any of the following symptoms:  Weight gain of 3 pounds or more overnight or 5 pounds in a week, increased swelling in our hands or feet or shortness of breath while lying flat in bed.   Please call your doctor as soon as you notice any of these symptoms; do not wait until your next office visit. Recognize signs and symptoms of STROKE:    F-face looks uneven    A-arms unable to move or move unevenly    S-speech slurred or non-existent    T-time-call 911 as soon as signs and symptoms begin-DO NOT go       Back to bed or wait to see if you get better-TIME IS BRAIN. ACTIVITY  · As tolerated and as directed by your doctor. · Bathe or shower as directed by your doctor. DIET  · Clear liquids until no nausea or vomiting; then light diet for the first day. · Advance to regular diet on second day, unless your doctor orders otherwise. · If nausea and vomiting continues, call your doctor. PAIN  · Take pain medication as directed by your doctor. · Call your doctor if pain is NOT relieved by medication. · DO NOT take aspirin of blood thinners unless directed by your doctor. DRESSING CARE       CALL YOUR DOCTOR IF   · Excessive bleeding that does not stop after holding pressure over the area  · Temperature of 101 degrees F or above  · Excessive redness, swelling or bruising, and/ or green or yellow, smelly discharge from incision    AFTER ANESTHESIA   · For the first 24 hours: DO NOT Drive, Drink alcoholic beverages, or Make important decisions. · Be aware of dizziness following anesthesia and while taking pain medication. APPOINTMENT DATE/ TIME    YOUR DOCTOR'S PHONE NUMBER       DISCHARGE SUMMARY from Nurse    PATIENT INSTRUCTIONS:    After general anesthesia or intravenous sedation, for 24 hours or while taking prescription Narcotics:  · Limit your activities  · Do not drive and operate hazardous machinery  · Do not make important personal or business decisions  · Do  not drink alcoholic beverages  · If you have not urinated within 8 hours after discharge, please contact your surgeon on call. *  Please give a list of your current medications to your Primary Care Provider.     *  Please update this list whenever your medications are discontinued, doses are      changed, or new medications (including over-the-counter products) are added. *  Please carry medication information at all times in case of emergency situations. These are general instructions for a healthy lifestyle:    No smoking/ No tobacco products/ Avoid exposure to second hand smoke    Surgeon General's Warning:  Quitting smoking now greatly reduces serious risk to your health. Obesity, smoking, and sedentary lifestyle greatly increases your risk for illness    A healthy diet, regular physical exercise & weight monitoring are important for maintaining a healthy lifestyle    You may be retaining fluid if you have a history of heart failure or if you experience any of the following symptoms:  Weight gain of 3 pounds or more overnight or 5 pounds in a week, increased swelling in our hands or feet or shortness of breath while lying flat in bed. Please call your doctor as soon as you notice any of these symptoms; do not wait until your next office visit. Recognize signs and symptoms of STROKE:    F-face looks uneven    A-arms unable to move or move unevenly    S-speech slurred or non-existent    T-time-call 911 as soon as signs and symptoms begin-DO NOT go       Back to bed or wait to see if you get better-TIME IS BRAIN.

## 2020-05-28 NOTE — ANESTHESIA PREPROCEDURE EVALUATION
Anesthetic History   No history of anesthetic complications            Review of Systems / Medical History  Patient summary reviewed, nursing notes reviewed and pertinent labs reviewed    Pulmonary  Within defined limits                 Neuro/Psych   Within defined limits           Cardiovascular  Within defined limits                Exercise tolerance: >4 METS     GI/Hepatic/Renal     GERD (No meds): well controlled           Endo/Other  Within defined limits           Other Findings              Physical Exam    Airway  Mallampati: II  TM Distance: > 6 cm  Neck ROM: normal range of motion   Mouth opening: Normal     Cardiovascular    Rhythm: regular  Rate: normal         Dental  No notable dental hx       Pulmonary  Breath sounds clear to auscultation               Abdominal         Other Findings            Anesthetic Plan    ASA: 2  Anesthesia type: total IV anesthesia            Anesthetic plan and risks discussed with: Patient

## 2020-05-28 NOTE — PERIOP NOTES
INSTRUCTIONS TO WIFE AND UNDERSTOOD. DR. Levy Poster SPOKE WITH THE WIFE. FOOT DRESSING IS DRY AND INTACT.

## 2020-05-29 NOTE — ANESTHESIA POSTPROCEDURE EVALUATION
Procedure(s):  LEFT FOOT TENEX ULTRASOUND GUIDED PLANTAR FASCIOTOMY .    total IV anesthesia    Anesthesia Post Evaluation      Multimodal analgesia: multimodal analgesia used between 6 hours prior to anesthesia start to PACU discharge  Patient location during evaluation: PACU  Patient participation: complete - patient participated  Level of consciousness: awake  Pain management: adequate  Airway patency: patent  Anesthetic complications: no  Cardiovascular status: acceptable and hemodynamically stable  Respiratory status: acceptable  Hydration status: acceptable  Comments: Acceptable for discharge from PACU. Post anesthesia nausea and vomiting:  none      INITIAL Post-op Vital signs:   Vitals Value Taken Time   /59 5/28/2020  2:26 PM   Temp 36.5 °C (97.7 °F) 5/28/2020  2:22 PM   Pulse 65 5/28/2020  2:26 PM   Resp 18 5/28/2020  2:26 PM   SpO2 95 % 5/28/2020  2:29 PM   Vitals shown include unvalidated device data.

## 2020-05-29 NOTE — OP NOTES
[facility]  OPERATIVE REPORT    Name:  India Rain  MR#:  [id number]  :  1965  ACCOUNT #:  [de-identified]  DATE OF SERVICE:  2020    PREOPERATIVE DIAGNOSIS:  Plantar fasciitis, left foot. POSTOPERATIVE DIAGNOSIS:  Plantar fasciitis, left foot. PROCEDURE PERFORMED:  Endoscopic plantar fasciotomy with ultrasound-guided Tenex, 71458. With Nucell Injection  SURGEON:  Cici Rene MD    ASSISTANT:  none    ANESTHESIA:  Approximately 20 mL of 1% lidocaine plain as posterior tibial nerve block, left foot, with MAC anesthesia. HEMOSTASIS:  Anatomic dissection. COMPLICATIONS:  None. SPECIMENS REMOVED:  none    IMPLANTS:  none    ESTIMATED BLOOD LOSS:  Less than 5 mL. MATERIALS:  None    INJECTABLES:  23 mL of 0.5% Marcaine plain in the posterior tibial nerve block behind the medial malleolus. 1cc Nucell biologic    INDICATIONS FOR SURGERY:  The patient is a pleasant 70-year-old male who complains of chronic plantar left heel pain. His pain was unresponsive to conservative care. MRI was ordered and found plantar fasciitis. Risks and benefits of the above procedure were discussed in detail. All questions were answered. Consents were signed and the patient was brought to the operating room for the procedure as an outpatient. PROCEDURE:  A sterile sleeve was placed over the ultrasound transducer. The anatomy was identified and the diseased tissue was visualized and confirmed at the insertion of the plantar fascia on the plantar medial calcaneal tubercle. The foot was scrubbed, prepped, and draped using the aseptic manner. After a preoperative local block was given at the posterior tibial tendon, an 11-blade was used to incise through the skin. The blade continued through the subcutaneous tissue.   Once the tip of the inserted probe confirmed the pathological tissue, the ultrasound foot pedal was depressed and the fascia was incised along its length cutting and removing the diseased fascia. The settings of the Tenex machine were on medium and it was used for between 4 and 5 minutes. Following the procedure, a 2 x 2 gauze followed by Tegaderm was placed. The patient will wear a CAM boot for a minimum of two weeks postoperatively. The patient was discharged with both oral and written postoperative instructions. He is to follow up in the office one week after surgery.       Aiden Heredia MD      [id number]  D:  05/28/2020 14:28  T:  05/29/2020 1:40  JOB #:  [id number]

## 2021-03-27 ENCOUNTER — HOSPITAL ENCOUNTER (EMERGENCY)
Age: 56
Discharge: HOME | End: 2021-03-27
Attending: EMERGENCY MEDICINE
Payer: COMMERCIAL

## 2021-03-27 ENCOUNTER — APPOINTMENT (OUTPATIENT)
Dept: GENERAL RADIOLOGY | Age: 56
End: 2021-03-27
Attending: EMERGENCY MEDICINE
Payer: COMMERCIAL

## 2021-03-27 VITALS
TEMPERATURE: 98.4 F | RESPIRATION RATE: 16 BRPM | HEART RATE: 89 BPM | DIASTOLIC BLOOD PRESSURE: 74 MMHG | SYSTOLIC BLOOD PRESSURE: 120 MMHG | WEIGHT: 192 LBS | HEIGHT: 67 IN | OXYGEN SATURATION: 94 % | BODY MASS INDEX: 30.13 KG/M2

## 2021-03-27 DIAGNOSIS — R55 VASOVAGAL ATTACK: ICD-10-CM

## 2021-03-27 DIAGNOSIS — L50.9 HIVES: Primary | ICD-10-CM

## 2021-03-27 LAB
ALBUMIN SERPL-MCNC: 3.8 G/DL (ref 3.5–5)
ALBUMIN/GLOB SERPL: 1.1 {RATIO} (ref 1.2–3.5)
ALP SERPL-CCNC: 73 U/L (ref 50–136)
ALT SERPL-CCNC: 30 U/L (ref 12–65)
ANION GAP SERPL CALC-SCNC: 5 MMOL/L (ref 7–16)
APTT PPP: 28.6 SEC (ref 24.1–35.1)
AST SERPL-CCNC: 16 U/L (ref 15–37)
ATRIAL RATE: 88 BPM
BASOPHILS # BLD: 0 K/UL (ref 0–0.2)
BASOPHILS NFR BLD: 0 % (ref 0–2)
BILIRUB SERPL-MCNC: 0.6 MG/DL (ref 0.2–1.1)
BUN SERPL-MCNC: 11 MG/DL (ref 6–23)
CALCIUM SERPL-MCNC: 9.3 MG/DL (ref 8.3–10.4)
CALCULATED P AXIS, ECG09: 34 DEGREES
CALCULATED R AXIS, ECG10: 97 DEGREES
CALCULATED T AXIS, ECG11: -5 DEGREES
CHLORIDE SERPL-SCNC: 103 MMOL/L (ref 98–107)
CO2 SERPL-SCNC: 31 MMOL/L (ref 21–32)
CREAT SERPL-MCNC: 1.21 MG/DL (ref 0.8–1.5)
DIAGNOSIS, 93000: NORMAL
DIFFERENTIAL METHOD BLD: ABNORMAL
EOSINOPHIL # BLD: 0.1 K/UL (ref 0–0.8)
EOSINOPHIL NFR BLD: 0 % (ref 0.5–7.8)
ERYTHROCYTE [DISTWIDTH] IN BLOOD BY AUTOMATED COUNT: 12.1 % (ref 11.9–14.6)
GLOBULIN SER CALC-MCNC: 3.4 G/DL (ref 2.3–3.5)
GLUCOSE SERPL-MCNC: 102 MG/DL (ref 65–100)
HCT VFR BLD AUTO: 48.2 % (ref 41.1–50.3)
HGB BLD-MCNC: 16.3 G/DL (ref 13.6–17.2)
IMM GRANULOCYTES # BLD AUTO: 0 K/UL (ref 0–0.5)
IMM GRANULOCYTES NFR BLD AUTO: 0 % (ref 0–5)
INR PPP: 0.9
LYMPHOCYTES # BLD: 2.1 K/UL (ref 0.5–4.6)
LYMPHOCYTES NFR BLD: 18 % (ref 13–44)
MCH RBC QN AUTO: 31.2 PG (ref 26.1–32.9)
MCHC RBC AUTO-ENTMCNC: 33.8 G/DL (ref 31.4–35)
MCV RBC AUTO: 92.3 FL (ref 79.6–97.8)
MONOCYTES # BLD: 0.5 K/UL (ref 0.1–1.3)
MONOCYTES NFR BLD: 5 % (ref 4–12)
NEUTS SEG # BLD: 9.3 K/UL (ref 1.7–8.2)
NEUTS SEG NFR BLD: 77 % (ref 43–78)
NRBC # BLD: 0 K/UL (ref 0–0.2)
P-R INTERVAL, ECG05: 128 MS
PLATELET # BLD AUTO: 246 K/UL (ref 150–450)
PMV BLD AUTO: 8.5 FL (ref 9.4–12.3)
POTASSIUM SERPL-SCNC: 4 MMOL/L (ref 3.5–5.1)
PROT SERPL-MCNC: 7.2 G/DL (ref 6.3–8.2)
PROTHROMBIN TIME: 12.7 SEC (ref 12.5–14.7)
Q-T INTERVAL, ECG07: 320 MS
QRS DURATION, ECG06: 78 MS
QTC CALCULATION (BEZET), ECG08: 387 MS
RBC # BLD AUTO: 5.22 M/UL (ref 4.23–5.6)
SODIUM SERPL-SCNC: 139 MMOL/L (ref 136–145)
TROPONIN-HIGH SENSITIVITY: 4.4 PG/ML (ref 0–14)
TROPONIN-HIGH SENSITIVITY: 4.4 PG/ML (ref 0–14)
VENTRICULAR RATE, ECG03: 88 BPM
WBC # BLD AUTO: 12.1 K/UL (ref 4.3–11.1)

## 2021-03-27 PROCEDURE — 80053 COMPREHEN METABOLIC PANEL: CPT

## 2021-03-27 PROCEDURE — 93005 ELECTROCARDIOGRAM TRACING: CPT | Performed by: EMERGENCY MEDICINE

## 2021-03-27 PROCEDURE — 99284 EMERGENCY DEPT VISIT MOD MDM: CPT

## 2021-03-27 PROCEDURE — 84484 ASSAY OF TROPONIN QUANT: CPT

## 2021-03-27 PROCEDURE — 85025 COMPLETE CBC W/AUTO DIFF WBC: CPT

## 2021-03-27 PROCEDURE — 85610 PROTHROMBIN TIME: CPT

## 2021-03-27 PROCEDURE — 71046 X-RAY EXAM CHEST 2 VIEWS: CPT

## 2021-03-27 PROCEDURE — 74011636637 HC RX REV CODE- 636/637: Performed by: EMERGENCY MEDICINE

## 2021-03-27 PROCEDURE — 85730 THROMBOPLASTIN TIME PARTIAL: CPT

## 2021-03-27 RX ORDER — PREDNISONE 20 MG/1
60 TABLET ORAL DAILY
Qty: 12 TAB | Refills: 0 | Status: SHIPPED | OUTPATIENT
Start: 2021-03-27 | End: 2021-03-31

## 2021-03-27 RX ORDER — HYDROXYZINE 50 MG/1
50 TABLET, FILM COATED ORAL
Qty: 20 TAB | Refills: 0 | Status: SHIPPED | OUTPATIENT
Start: 2021-03-27 | End: 2021-04-06

## 2021-03-27 RX ADMIN — PREDNISONE 60 MG: 10 TABLET ORAL at 13:16

## 2021-03-27 NOTE — ED TRIAGE NOTES
Pt ambulatory to triage. Pt states he has intermittent hives since [place] of what is causing them and has appt on Monday with allergist. Has EPI pen if feels SOB, feels throat closing. Pt states came in because his top lip started swelling. Denies SOB or feeling like throat is closing at this time. No tongue swelling.

## 2021-03-27 NOTE — ED PROVIDER NOTES
61-year-old male presenting for recurrent hives some wheezing and rapid heart rate. Tells me he has been getting these since January treated with steroids and they went away  Seen by primary care doctor who did basic labs C4 complement which was unremarkable as well as a Covid test which was negative. Began having hives again on his chest and neck yesterday came in for further evaluation. The history is provided by the patient. Allergic Reaction   This is a recurrent problem. The current episode started yesterday. The problem has not changed since onset. He ingested an unknown item. Ingestion amount is unknown. There were no pill fragments found in his mouth. There were no witnesses present for the ingestion. Pertinent negatives include no unusual behavior, no slurred speech, no walking unsteadily, no nausea, no vomiting, no depression, no suicidal ideas, no confusion and no shortness of breath. There were no other medications available. His past medical history does not include depression, psychosis, psychiatric care, suicidal ideation, bipolar disorder, suicidal plan, suicide attempts or substance abuse.         Past Medical History:   Diagnosis Date    Arthritis     left index finger    Carpal tunnel syndrome, right     GERD (gastroesophageal reflux disease)     controlled by probiotic    Hiatal hernia 02/02/2016    see on EGD    Hyperlipidemia     Plantar fasciitis, left        Past Surgical History:   Procedure Laterality Date    HX CARPAL TUNNEL RELEASE Right     HX COLONOSCOPY           Family History:   Problem Relation Age of Onset    Dementia Mother     Heart defect Mother     No Known Problems Father     No Known Problems Sister        Social History     Socioeconomic History    Marital status:      Spouse name: Not on file    Number of children: Not on file    Years of education: Not on file    Highest education level: Not on file   Occupational History    Not on file Social Needs    Financial resource strain: Not on file    Food insecurity     Worry: Not on file     Inability: Not on file    Transportation needs     Medical: Not on file     Non-medical: Not on file   Tobacco Use    Smoking status: Never Smoker    Smokeless tobacco: Never Used   Substance and Sexual Activity    Alcohol use: No    Drug use: No    Sexual activity: Not on file   Lifestyle    Physical activity     Days per week: Not on file     Minutes per session: Not on file    Stress: Not on file   Relationships    Social connections     Talks on phone: Not on file     Gets together: Not on file     Attends [employer] service: Not on file     Active member of club or organization: Not on file     Attends meetings of clubs or organizations: Not on file     Relationship status: Not on file    Intimate partner violence     Fear of current or ex partner: Not on file     Emotionally abused: Not on file     Physically abused: Not on file     Forced sexual activity: Not on file   Other Topics Concern    Not on file   Social History Narrative    Not on file         ALLERGIES: Patient has no known allergies. Review of Systems   Respiratory: Positive for wheezing. Negative for shortness of breath. Gastrointestinal: Negative for nausea and vomiting. Skin: Positive for rash. Psychiatric/Behavioral: Negative for confusion, depression, substance abuse and suicidal ideas. All other systems reviewed and are negative. Vitals:    03/27/21 1254   BP: 124/89   Pulse: (!) 111   Resp: 16   Temp: 98.4 °F (36.9 °C)   SpO2: 97%   Weight: 87.1 kg (192 lb)   Height: 5' 7\" (1.702 m)            Physical Exam  Vitals signs and nursing note reviewed. Constitutional:       Appearance: He is well-developed. HENT:      Head: Normocephalic and atraumatic. Eyes:      Conjunctiva/sclera: Conjunctivae normal.      Pupils: Pupils are equal, round, and reactive to light.    Neck:      Musculoskeletal: Normal range of motion and neck supple. Cardiovascular:      Rate and Rhythm: Regular rhythm. Tachycardia present. Heart sounds: Normal heart sounds. Pulmonary:      Effort: Pulmonary effort is normal.      Breath sounds: Wheezing present. Abdominal:      General: Bowel sounds are normal.      Palpations: Abdomen is soft. Musculoskeletal: Normal range of motion. General: No deformity. Skin:     General: Skin is warm and dry. Findings: Rash present. Neurological:      Mental Status: He is alert and oriented to person, place, and time. Cranial Nerves: No cranial nerve deficit. Psychiatric:         Behavior: Behavior normal.          MDM  Number of Diagnoses or Management Options  Hives  Vasovagal attack  Diagnosis management comments: 63-year-old male presenting for hives and mild wheezing. He is also concerned about his heart rate. It is a regular tachycardia so we can get a chest x-ray and given prednisone.        Amount and/or Complexity of Data Reviewed  Clinical lab tests: ordered and reviewed (Results for orders placed or performed during the hospital encounter of 03/27/21  -CBC WITH AUTOMATED DIFF       Result                      Value             Ref Range           WBC                         12.1 (H)          4.3 - 11.1 K*       RBC                         5.22              4.23 - 5.6 M*       HGB                         16.3              13.6 - 17.2 *       HCT                         48.2              41.1 - 50.3 %       MCV                         92.3              79.6 - 97.8 *       MCH                         31.2              26.1 - 32.9 *       MCHC                        33.8              31.4 - 35.0 *       RDW                         12.1              11.9 - 14.6 %       PLATELET                    246               150 - 450 K/*       MPV                         8.5 (L)           9.4 - 12.3 FL       ABSOLUTE NRBC               0.00              0.0 - 0.2 K/*       DF AUTOMATED                             NEUTROPHILS                 77                43 - 78 %           LYMPHOCYTES                 18                13 - 44 %           MONOCYTES                   5                 4.0 - 12.0 %        EOSINOPHILS                 0 (L)             0.5 - 7.8 %         BASOPHILS                   0                 0.0 - 2.0 %         IMMATURE GRANULOCYTES       0                 0.0 - 5.0 %         ABS. NEUTROPHILS            9.3 (H)           1.7 - 8.2 K/*       ABS. LYMPHOCYTES            2.1               0.5 - 4.6 K/*       ABS. MONOCYTES              0.5               0.1 - 1.3 K/*       ABS. EOSINOPHILS            0.1               0.0 - 0.8 K/*       ABS. BASOPHILS              0.0               0.0 - 0.2 K/*       ABS. IMM.  GRANS.            0.0               0.0 - 0.5 K/*  -PROTHROMBIN TIME + INR       Result                      Value             Ref Range           Prothrombin time            12.7              12.5 - 14.7 *       INR                         0.9                              -PTT       Result                      Value             Ref Range           aPTT                        28.6              24.1 - 96.7 *  -METABOLIC PANEL, COMPREHENSIVE       Result                      Value             Ref Range           Sodium                      139               136 - 145 mm*       Potassium                   4.0               3.5 - 5.1 mm*       Chloride                    103               98 - 107 mmo*       CO2                         31                21 - 32 mmol*       Anion gap                   5 (L)             7 - 16 mmol/L       Glucose                     102 (H)           65 - 100 mg/*       BUN                         11                6 - 23 MG/DL        Creatinine                  1.21              0.8 - 1.5 MG*       GFR est AA                  >60               >60 ml/min/1*       GFR est non-AA              >60 >60 ml/min/1*       Calcium                     9.3               8.3 - 10.4 M*       Bilirubin, total            0.6               0.2 - 1.1 MG*       ALT (SGPT)                  30                12 - 65 U/L         AST (SGOT)                  16                15 - 37 U/L         Alk. phosphatase            73                50 - 136 U/L        Protein, total              7.2               6.3 - 8.2 g/*       Albumin                     3.8               3.5 - 5.0 g/*       Globulin                    3.4               2.3 - 3.5 g/*       A-G Ratio                   1.1 (L)           1.2 - 3.5      -TROPONIN-HIGH SENSITIVITY       Result                      Value             Ref Range           Troponin-High Sensitiv*     4.4               0 - 14 pg/mL   -TROPONIN-HIGH SENSITIVITY       Result                      Value             Ref Range           Troponin-High Sensitiv*     4.4               0 - 14 pg/mL   -EKG, 12 LEAD, INITIAL       Result                      Value             Ref Range           Ventricular Rate            88                BPM                 Atrial Rate                 88                BPM                 P-R Interval                128               ms                  QRS Duration                78                ms                  Q-T Interval                320               ms                  QTC Calculation (Bezet)     387               ms                  Calculated P Axis           34                degrees             Calculated R Axis           97                degrees             Calculated T Axis           -5                degrees             Diagnosis                                                     !! AGE AND GENDER SPECIFIC ECG ANALYSIS !!    Normal sinus rhythm   Rightward axis   Low voltage QRS   Abnormal QRS-T angle, consider primary T wave abnormality   Abnormal ECG   No previous ECGs available     )  Tests in the radiology section of CPT®: ordered and reviewed  Independent visualization of images, tracings, or specimens: yes    Risk of Complications, Morbidity, and/or Mortality  Presenting problems: high  Diagnostic procedures: high  Management options: moderate  General comments: Patient had this singular episode of low blood pressure and diaphoresis seems to be most consistent with a vasovagal episode. EKG showed no ischemic changes. Troponins are completely undetectable. Discharging the patient home with medications for hives and PCP follow-up. I personally reviewed the patient's vital signs, laboratory tests, and/or radiological findings. I discussed these findings with the patient and their significance. I answered all questions and gave the patient clear return precautions. The patient was discharged from the emergency department in stable condition        Patient Progress  Patient progress: improved    ED Course as of Mar 27 1623   Sat Mar 27, 2021   1352 Patient now complaining of chest tightness diaphoresis. EKG performed shows sinus tachycardia but no acute ischemic changes. Adding blood work and troponin.     [JS]      ED Course User Index  [JS] Caity Espinoza MD       Procedures

## 2021-03-27 NOTE — ED NOTES
I have reviewed discharge instructions with the patient. The patient verbalized understanding. Patient left ED via Discharge Method: wheelchair to Home with self. Gerald Olson Opportunity for questions and clarification provided. Patient given 2 scripts. To continue your aftercare when you leave the hospital, you may receive an automated call from our care team to check in on how you are doing. This is a free service and part of our promise to provide the best care and service to meet your aftercare needs.  If you have questions, or wish to unsubscribe from this service please call [phone]. Thank you for Choosing our [facility] Emergency Department.

## 2021-06-30 PROBLEM — K13.0 CHEILITIS: Status: ACTIVE | Noted: 2021-06-30

## 2021-06-30 PROBLEM — Z86.79 HISTORY OF HYPOTENSION: Status: ACTIVE | Noted: 2021-06-30

## 2021-06-30 PROBLEM — R06.09 DYSPNEA ON EXERTION: Status: ACTIVE | Noted: 2021-06-30

## 2021-06-30 PROBLEM — R00.2 PALPITATIONS: Status: ACTIVE | Noted: 2021-06-30

## 2021-07-21 PROBLEM — T78.3XXA ANGIOEDEMA: Status: ACTIVE | Noted: 2021-07-21

## 2021-07-21 PROBLEM — L50.1 CHRONIC IDIOPATHIC URTICARIA: Status: ACTIVE | Noted: 2021-07-21

## (undated) DEVICE — (D)PREP SKN CHLRAPRP APPL 26ML -- CONVERT TO ITEM 371833

## (undated) DEVICE — DRAPE,U/SHT,SPLIT,FILM,60X84,STERILE: Brand: MEDLINE

## (undated) DEVICE — STERILE HOOK LOCK LATEX FREE ELASTIC BANDAGE 3INX5YD: Brand: HOOK LOCK™

## (undated) DEVICE — CARDINAL HEALTH FLEXIBLE LIGHT HANDLE COVER: Brand: CARDINAL HEALTH

## (undated) DEVICE — 3M™ TEGADERM™ TRANSPARENT FILM DRESSING FRAME STYLE, 1624W, 2-3/8 IN X 2-3/4 IN (6 CM X 7 CM), 100/CT 4CT/CASE: Brand: 3M™ TEGADERM™

## (undated) DEVICE — DRAPE, FILM SHEET, 44X65 STERILE: Brand: MEDLINE

## (undated) DEVICE — BANDAGE,GAUZE,CONFORMING,3"X75",STRL,LF: Brand: MEDLINE

## (undated) DEVICE — GOWN,SIRUS,NONRNF,SETINSLV,XL,20/CS: Brand: MEDLINE

## (undated) DEVICE — DRAPE,TOP,102X53,STERILE: Brand: MEDLINE

## (undated) DEVICE — GAUZE,SPONGE,2"X2",8PLY,STERILE,LF,2'S: Brand: MEDLINE

## (undated) DEVICE — SOLUTION IV 1000ML 0.9% SOD CHL

## (undated) DEVICE — AMD ANTIMICROBIAL GAUZE SPONGES,12 PLY USP TYPE VII, 0.2% POLYHEXAMETHYLENE BIGUANIDE HCI (PHMB): Brand: CURITY

## (undated) DEVICE — TX2 PROCEDURE PACK: Brand: TENEX HEALTH TX®

## (undated) DEVICE — STOCKINETTE TBLR COT PRECUT DBL PLY

## (undated) DEVICE — SUT ETHLN 3-0 18IN PS1 BLK --

## (undated) DEVICE — DRESSING,GAUZE,XEROFORM,CURAD,1"X8",ST: Brand: CURAD

## (undated) DEVICE — STOCKINETTE TUBE 6X48 -- MEDICHOICE

## (undated) DEVICE — NEEDLE HYPO 18GA L1.5IN PNK S STL HUB POLYPR SHLD REG BVL

## (undated) DEVICE — DISPOSABLE TOURNIQUET CUFF SINGLE BLADDER, DUAL PORT AND LUER LOCK CONNECTOR: Brand: COLOR CUFF

## (undated) DEVICE — Device

## (undated) DEVICE — SPONGE GZ W4XL4IN COT 12 PLY TYP VII WVN C FLD DSGN

## (undated) DEVICE — DRAPE,ORTHOMAX,EXTREMITY: Brand: MEDLINE